# Patient Record
Sex: MALE | Race: WHITE
[De-identification: names, ages, dates, MRNs, and addresses within clinical notes are randomized per-mention and may not be internally consistent; named-entity substitution may affect disease eponyms.]

---
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
## 2020-10-29 ENCOUNTER — LAB REQUISITION (OUTPATIENT)
Dept: LAB | Facility: HOSPITAL | Age: 20
End: 2020-10-29

## 2020-10-29 DIAGNOSIS — Z00.00 ROUTINE GENERAL MEDICAL EXAMINATION AT A HEALTH CARE FACILITY: ICD-10-CM

## 2020-10-29 LAB — SARS-COV-2 RNA RESP QL NAA+PROBE: NOT DETECTED

## 2020-10-29 PROCEDURE — 87635 SARS-COV-2 COVID-19 AMP PRB: CPT

## 2022-09-10 PROCEDURE — 87147 CULTURE TYPE IMMUNOLOGIC: CPT | Performed by: NURSE PRACTITIONER

## 2022-09-10 PROCEDURE — 87070 CULTURE OTHR SPECIMN AEROBIC: CPT | Performed by: NURSE PRACTITIONER

## 2022-09-10 PROCEDURE — 87186 SC STD MICRODIL/AGAR DIL: CPT | Performed by: NURSE PRACTITIONER

## 2022-09-10 PROCEDURE — 87205 SMEAR GRAM STAIN: CPT | Performed by: NURSE PRACTITIONER

## 2022-09-14 ENCOUNTER — TELEPHONE (OUTPATIENT)
Dept: URGENT CARE | Facility: CLINIC | Age: 22
End: 2022-09-14

## 2022-09-14 DIAGNOSIS — A49.01 STAPH AUREUS INFECTION: Primary | ICD-10-CM

## 2022-09-14 DIAGNOSIS — B95.0 STREPTOCOCCAL INFECTION GROUP A: ICD-10-CM

## 2022-09-14 NOTE — TELEPHONE ENCOUNTER
Attempted to contact the patient via telephone with no answer.  Called patient's contact number with notification that number is not accepting colors at this time.  Attempted to contact secondary contact number with notification that their voicemail box is full.  Patient tested positive for Staph aureus and Streptococcus pyrogenes.  Staph shows sensitivity to Bactrim and strep shows sensitivity to penicillins.  Patient started on Keflex and Bactrim in office which are appropriate treatments for infections.  Patient should complete entire course of antibiotics as prescribed.  We will attempt to contact the patient again at a later time.

## 2022-09-15 ENCOUNTER — TELEPHONE (OUTPATIENT)
Dept: URGENT CARE | Facility: CLINIC | Age: 22
End: 2022-09-15

## 2022-09-15 DIAGNOSIS — A49.01 STAPH AUREUS INFECTION: Primary | ICD-10-CM

## 2022-09-15 DIAGNOSIS — B95.0 STREPTOCOCCAL INFECTION GROUP A: ICD-10-CM

## 2022-09-15 NOTE — TELEPHONE ENCOUNTER
Attempted to contact the patient via telephone with no answer.  Called patient's contact number with notification that number is not accepting colors at this time.  Attempted to contact secondary contact number with notification that their voicemail box is full.  Patient tested positive for Staph aureus and Streptococcus pyrogenes.  Staph shows sensitivity to Bactrim and strep shows sensitivity to penicillins.  Patient started on Keflex and Bactrim in office which are appropriate treatments for infections.  Patient should complete entire course of antibiotics as prescribed.

## 2023-10-29 ENCOUNTER — HOSPITAL ENCOUNTER (EMERGENCY)
Facility: HOSPITAL | Age: 23
Discharge: HOME | End: 2023-10-29
Attending: EMERGENCY MEDICINE | Admitting: EMERGENCY MEDICINE
Payer: COMMERCIAL

## 2023-10-29 VITALS
OXYGEN SATURATION: 98 % | HEART RATE: 93 BPM | DIASTOLIC BLOOD PRESSURE: 78 MMHG | RESPIRATION RATE: 14 BRPM | SYSTOLIC BLOOD PRESSURE: 136 MMHG | TEMPERATURE: 98.6 F

## 2023-10-29 DIAGNOSIS — F19.10 SUBSTANCE ABUSE: Primary | ICD-10-CM

## 2023-10-29 PROCEDURE — 63710000001 ONDANSETRON ODT 4 MG TABLET DISPERSIBLE: Performed by: EMERGENCY MEDICINE

## 2023-10-29 PROCEDURE — 99283 EMERGENCY DEPT VISIT LOW MDM: CPT

## 2023-10-29 RX ORDER — ONDANSETRON 4 MG/1
4 TABLET, ORALLY DISINTEGRATING ORAL ONCE
Status: COMPLETED | OUTPATIENT
Start: 2023-10-29 | End: 2023-10-29

## 2023-10-29 RX ADMIN — ONDANSETRON 4 MG: 4 TABLET, ORALLY DISINTEGRATING ORAL at 14:33

## 2023-10-29 NOTE — DISCHARGE INSTRUCTIONS
Seek help at any of the resources listed below for your substance use:  Alcoholics Anonymous [facility]Help Group [phone]  Narcotics Anonymous Self-Help Group [phone]  [facility] [phone]  [facility] [phone]  [facility] [phone]  [facility] [phone]  [facility] [phone]  [facility] [phone]  [facility] [phone]  [facility] [phone]    If at any point you have thoughts of harming yourself (thoughts of suicide), then you can call:    National Suicide Prevention Lifeline  The Lifeline provides 24/7, free and confidential support for people in distress as well as prevention and crisis resources for you or your loved ones.    [phone]

## 2023-10-29 NOTE — ED PROVIDER NOTES
" EMERGENCY DEPARTMENT ENCOUNTER    Room Number:  22/22  PCP: Provider, No Known  Patient Care Team:  Provider, No Known as PCP - General   Independent Historians: Patient and ems    HPI:  Chief Complaint: overdose     A complete HPI/ROS/PMH/PSH/SH/FH are unobtainable due to: Patient a poor historian and uncooperative with providing details    Chronic or social conditions impacting patient care (Social Determinants of Health): Uncertain housing situation  (Financial Resource Strain / Food Insecurity / Transportation Needs / Physical Activity / Stress / Social Connections / Intimate Partner Violence / Housing Stability)    Context: Navneet Gil is a 23 y.o. male who presents to the ED c/o acute drug overdose.  Patient found obtunded and given 6 mg of nasal Narcan by first responders.  Patient was then transported here for further evaluation as he had good response and woke up with Narcan but then started repeatedly vomiting.  Patient states he has overdosed before and required Narcan and is aware of the \"side effects\" of the medication.  Despite patient just recently vomiting he is already asking for peanut butter and something to drink and \"some pills\" to help him feel better.    Review of prior external notes (non-ED) -and- Review of prior external test results outside of this encounter: None available    Prescription drug monitoring program review:         PAST MEDICAL HISTORY  Active Ambulatory Problems     Diagnosis Date Noted    No Active Ambulatory Problems     Resolved Ambulatory Problems     Diagnosis Date Noted    No Resolved Ambulatory Problems     Past Medical History:   Diagnosis Date    Depression     Hypertension          PAST SURGICAL HISTORY  No past surgical history on file.      FAMILY HISTORY  No family history on file.      SOCIAL HISTORY  Social History     Socioeconomic History    Marital status: Single   Tobacco Use    Smoking status: Every Day     Packs/day: .5     Types: Cigarettes    " Smokeless tobacco: Never   Vaping Use    Vaping Use: Never used   Substance and Sexual Activity    Alcohol use: Yes     Comment: social    Drug use: Yes     Types: IV, Methamphetamines, Marijuana, Cocaine(coke)         ALLERGIES  Patient has no known allergies.        REVIEW OF SYSTEMS  Review of Systems  Included in HPI  All systems reviewed and negative except for those discussed in HPI.      PHYSICAL EXAM    I have reviewed the triage vital signs and nursing notes.    ED Triage Vitals [10/29/23 1408]   Temp Heart Rate Resp BP SpO2   98.6 °F (37 °C) 114 14 136/78 98 %      Temp src Heart Rate Source Patient Position BP Location FiO2 (%)   -- -- -- -- --       Physical Exam  GENERAL: Disheveled and not conversant  male, alert, repeatedly asking for something to eat  SKIN: Warm, dry  HENT: Normocephalic, atraumatic  EYES: no scleral icterus, no conjunctivitis  CV: regular rhythm, regular rate, no murmur  RESPIRATORY: normal effort, lungs clear, no rhonchi  ABDOMEN: soft, nontender, nondistended  MUSCULOSKELETAL: no deformity  NEURO: alert, moves all extremities, follows commands                                                                 MEDICATIONS GIVEN IN ER    Medications   ondansetron ODT (ZOFRAN-ODT) disintegrating tablet 4 mg (4 mg Oral Given 10/29/23 1433)         ORDERS PLACED DURING THIS VISIT:  Orders Placed This Encounter   Procedures    Pulse Oximetry, Continuous         PROGRESS, DATA ANALYSIS, CONSULTS, AND MEDICAL DECISION MAKING    All labs have been independently interpreted by me.  All radiology studies have been reviewed by me.   EKG's independently viewed and interpreted by me.  Discussion below represents my analysis of pertinent findings related to patient's condition, differential diagnosis, treatment plan and final disposition.    MDM patient here status post heroin overdose.  Patient responded briskly to Narcan and has typical post Narcan withdrawal symptoms including nausea,  vomiting, chills, and body aches.  Patient already asking for something to eat and drink.  Will observe patient for any respiratory depression with continuous pulse ox.  We will give Zofran ODT and p.o. challenge patient..    ED Course as of 10/29/23 1533   Sun Oct 29, 2023   1522 Patient tolerating p.o. and alert and oriented and ambulatory. [AR]      ED Course User Index  [AR] Any Holland MD           PPE: I wore and adhered to appropriate PPE per hospital protocols for specific patient presentation. (For respiratory patients with suspected Covid-19 or other infectious etiology suspected for patient's symptoms, the patient wore a mask and I wore an N95 mask throughout the entire patient encounter.) Proper hand hygiene both before and after patient encounter was performed as well.         AS OF 15:33 EDT VITALS:    BP - 136/78  HR - 93  TEMP - 98.6 °F (37 °C)  O2 SATS - 98%        DIAGNOSIS  Final diagnoses:   Substance abuse         DISPOSITION  ED Disposition       ED Disposition   Discharge    Condition   Stable    Comment   --                  Note Disclaimer: At [facility], we believe that sharing information builds trust and better relationships. You are receiving this note because you recently visited [facility]. It is possible you will see health information before a provider has talked with you about it. This kind of information can be easy to misunderstand. To help you fully understand what it means for your health, we urge you to discuss this note with your provider.         Any Holland MD  10/29/23 1534

## 2023-10-29 NOTE — ED NOTES
Pt to ER via EMS from the side of the road. Pt was found to be unresponsive, given 6mg of Narcan intranasally. Pt is now AxO x4 but vomiting profusely Pt shot up heroin

## 2024-03-10 ENCOUNTER — APPOINTMENT (OUTPATIENT)
Dept: GENERAL RADIOLOGY | Facility: HOSPITAL | Age: 24
End: 2024-03-10
Payer: COMMERCIAL

## 2024-03-10 ENCOUNTER — HOSPITAL ENCOUNTER (EMERGENCY)
Facility: HOSPITAL | Age: 24
Discharge: HOME | End: 2024-03-10
Attending: EMERGENCY MEDICINE | Admitting: EMERGENCY MEDICINE
Payer: COMMERCIAL

## 2024-03-10 ENCOUNTER — APPOINTMENT (OUTPATIENT)
Dept: CT IMAGING | Facility: HOSPITAL | Age: 24
End: 2024-03-10
Payer: COMMERCIAL

## 2024-03-10 VITALS
WEIGHT: 168 LBS | DIASTOLIC BLOOD PRESSURE: 86 MMHG | HEART RATE: 94 BPM | TEMPERATURE: 96.3 F | RESPIRATION RATE: 18 BRPM | SYSTOLIC BLOOD PRESSURE: 131 MMHG | BODY MASS INDEX: 32.98 KG/M2 | HEIGHT: 60 IN | OXYGEN SATURATION: 93 %

## 2024-03-10 VITALS
HEART RATE: 75 BPM | HEIGHT: 71 IN | RESPIRATION RATE: 16 BRPM | TEMPERATURE: 97.7 F | DIASTOLIC BLOOD PRESSURE: 71 MMHG | OXYGEN SATURATION: 100 % | WEIGHT: 168 LBS | SYSTOLIC BLOOD PRESSURE: 128 MMHG | BODY MASS INDEX: 23.52 KG/M2

## 2024-03-10 DIAGNOSIS — F19.10 POLYSUBSTANCE ABUSE: Primary | ICD-10-CM

## 2024-03-10 DIAGNOSIS — Z59.00 HOMELESS: ICD-10-CM

## 2024-03-10 DIAGNOSIS — S50.11XA CONTUSION OF RIGHT FOREARM, INITIAL ENCOUNTER: ICD-10-CM

## 2024-03-10 DIAGNOSIS — S50.01XA CONTUSION OF RIGHT ELBOW, INITIAL ENCOUNTER: Primary | ICD-10-CM

## 2024-03-10 DIAGNOSIS — S09.90XA MINOR CLOSED HEAD INJURY: ICD-10-CM

## 2024-03-10 DIAGNOSIS — R40.0 SOMNOLENCE: ICD-10-CM

## 2024-03-10 LAB
ALBUMIN SERPL-MCNC: 3.8 G/DL (ref 3.5–5.2)
ALBUMIN/GLOB SERPL: 1.1 G/DL
ALP SERPL-CCNC: 77 U/L (ref 39–117)
ALT SERPL W P-5'-P-CCNC: 16 U/L (ref 1–41)
AMPHET+METHAMPHET UR QL: NEGATIVE
ANION GAP SERPL CALCULATED.3IONS-SCNC: 10 MMOL/L (ref 5–15)
AST SERPL-CCNC: 14 U/L (ref 1–40)
BARBITURATES UR QL SCN: NEGATIVE
BASOPHILS # BLD AUTO: 0.03 10*3/MM3 (ref 0–0.2)
BASOPHILS NFR BLD AUTO: 0.4 % (ref 0–1.5)
BENZODIAZ UR QL SCN: NEGATIVE
BILIRUB SERPL-MCNC: <0.2 MG/DL (ref 0–1.2)
BUN SERPL-MCNC: 12 MG/DL (ref 6–20)
BUN/CREAT SERPL: 19.7 (ref 7–25)
CALCIUM SPEC-SCNC: 9.2 MG/DL (ref 8.6–10.5)
CANNABINOIDS SERPL QL: POSITIVE
CHLORIDE SERPL-SCNC: 104 MMOL/L (ref 98–107)
CO2 SERPL-SCNC: 27 MMOL/L (ref 22–29)
COCAINE UR QL: POSITIVE
CREAT SERPL-MCNC: 0.61 MG/DL (ref 0.76–1.27)
DEPRECATED RDW RBC AUTO: 42.9 FL (ref 37–54)
EGFRCR SERPLBLD CKD-EPI 2021: 136.7 ML/MIN/1.73
EOSINOPHIL # BLD AUTO: 0.35 10*3/MM3 (ref 0–0.4)
EOSINOPHIL NFR BLD AUTO: 4.2 % (ref 0.3–6.2)
ERYTHROCYTE [DISTWIDTH] IN BLOOD BY AUTOMATED COUNT: 13.7 % (ref 12.3–15.4)
FENTANYL UR-MCNC: NEGATIVE NG/ML
GLOBULIN UR ELPH-MCNC: 3.4 GM/DL
GLUCOSE SERPL-MCNC: 122 MG/DL (ref 65–99)
HCT VFR BLD AUTO: 34.8 % (ref 37.5–51)
HGB BLD-MCNC: 11.2 G/DL (ref 13–17.7)
IMM GRANULOCYTES # BLD AUTO: 0.02 10*3/MM3 (ref 0–0.05)
IMM GRANULOCYTES NFR BLD AUTO: 0.2 % (ref 0–0.5)
LYMPHOCYTES # BLD AUTO: 3.28 10*3/MM3 (ref 0.7–3.1)
LYMPHOCYTES NFR BLD AUTO: 39.3 % (ref 19.6–45.3)
MCH RBC QN AUTO: 27.8 PG (ref 26.6–33)
MCHC RBC AUTO-ENTMCNC: 32.2 G/DL (ref 31.5–35.7)
MCV RBC AUTO: 86.4 FL (ref 79–97)
METHADONE UR QL SCN: NEGATIVE
MONOCYTES # BLD AUTO: 0.51 10*3/MM3 (ref 0.1–0.9)
MONOCYTES NFR BLD AUTO: 6.1 % (ref 5–12)
NEUTROPHILS NFR BLD AUTO: 4.16 10*3/MM3 (ref 1.7–7)
NEUTROPHILS NFR BLD AUTO: 49.8 % (ref 42.7–76)
NRBC BLD AUTO-RTO: 0 /100 WBC (ref 0–0.2)
OPIATES UR QL: NEGATIVE
OXYCODONE UR QL SCN: NEGATIVE
PLATELET # BLD AUTO: 457 10*3/MM3 (ref 140–450)
PMV BLD AUTO: 8.7 FL (ref 6–12)
POTASSIUM SERPL-SCNC: 3.9 MMOL/L (ref 3.5–5.2)
PROT SERPL-MCNC: 7.2 G/DL (ref 6–8.5)
RBC # BLD AUTO: 4.03 10*6/MM3 (ref 4.14–5.8)
SODIUM SERPL-SCNC: 141 MMOL/L (ref 136–145)
WBC NRBC COR # BLD AUTO: 8.35 10*3/MM3 (ref 3.4–10.8)

## 2024-03-10 PROCEDURE — 85025 COMPLETE CBC W/AUTO DIFF WBC: CPT | Performed by: NURSE PRACTITIONER

## 2024-03-10 PROCEDURE — 80307 DRUG TEST PRSMV CHEM ANLYZR: CPT | Performed by: NURSE PRACTITIONER

## 2024-03-10 PROCEDURE — 73080 X-RAY EXAM OF ELBOW: CPT

## 2024-03-10 PROCEDURE — 73090 X-RAY EXAM OF FOREARM: CPT

## 2024-03-10 PROCEDURE — 25810000003 SODIUM CHLORIDE 0.9 % SOLUTION: Performed by: NURSE PRACTITIONER

## 2024-03-10 PROCEDURE — 96360 HYDRATION IV INFUSION INIT: CPT

## 2024-03-10 PROCEDURE — 99284 EMERGENCY DEPT VISIT MOD MDM: CPT

## 2024-03-10 PROCEDURE — 73110 X-RAY EXAM OF WRIST: CPT

## 2024-03-10 PROCEDURE — 80053 COMPREHEN METABOLIC PANEL: CPT | Performed by: NURSE PRACTITIONER

## 2024-03-10 PROCEDURE — 99283 EMERGENCY DEPT VISIT LOW MDM: CPT

## 2024-03-10 PROCEDURE — 73070 X-RAY EXAM OF ELBOW: CPT

## 2024-03-10 PROCEDURE — 70450 CT HEAD/BRAIN W/O DYE: CPT

## 2024-03-10 RX ORDER — SODIUM CHLORIDE 0.9 % (FLUSH) 0.9 %
10 SYRINGE (ML) INJECTION AS NEEDED
Status: DISCONTINUED | OUTPATIENT
Start: 2024-03-10 | End: 2024-03-10 | Stop reason: HOSPADM

## 2024-03-10 RX ADMIN — SODIUM CHLORIDE 1000 ML: 9 INJECTION, SOLUTION INTRAVENOUS at 06:45

## 2024-03-10 SDOH — ECONOMIC STABILITY - HOUSING INSECURITY: HOMELESSNESS UNSPECIFIED: Z59.00

## 2024-03-10 NOTE — ED PROVIDER NOTES
MD ATTESTATION NOTE    SHARED VISIT: This visit was performed by BOTH a physician and an APC. The substantive portion of the medical decision making was performed by this attesting physician who made or approved the management plan and takes responsibility for patient management. All studies in the APC note (if performed) were independently interpreted by me.     The AFTAB and I have discussed this patient's history, physical exam, and treatment plan.  I have reviewed the documentation and affirm the documentation and agree with the treatment and plan.  The attached note describes my personal findings.      Independent Historians: EMS    A complete HPI/ROS/PMH/PSH/SH/FH are unobtainable due to: Uncooperative    Chronic or social conditions impacting patient care (social determinants of health): Gareth Gil is a 23 y.o. male who presents to the ED c/o acute substance abuse.          On exam:  GENERAL: Drowsy but arousable, no acute distress, admits heroin and cocaine use  SKIN: Warm, dry  HENT: Normocephalic, atraumatic, dry mucous membranes EYES: no scleral icterus  CV: regular rhythm, regular rate  RESPIRATORY: normal effort, lungs clear  ABDOMEN: soft, nontender, nondistended  MUSCULOSKELETAL: no deformity, no involuntary movements  NEURO: alert, moves all extremities, follows commands                                                             Labs  Recent Results (from the past 24 hour(s))   Comprehensive Metabolic Panel    Collection Time: 03/10/24  6:44 AM    Specimen: Blood   Result Value Ref Range    Glucose 122 (H) 65 - 99 mg/dL    BUN 12 6 - 20 mg/dL    Creatinine 0.61 (L) 0.76 - 1.27 mg/dL    Sodium 141 136 - 145 mmol/L    Potassium 3.9 3.5 - 5.2 mmol/L    Chloride 104 98 - 107 mmol/L    CO2 27.0 22.0 - 29.0 mmol/L    Calcium 9.2 8.6 - 10.5 mg/dL    Total Protein 7.2 6.0 - 8.5 g/dL    Albumin 3.8 3.5 - 5.2 g/dL    ALT (SGPT) 16 1 - 41 U/L    AST (SGOT) 14 1 - 40 U/L    Alkaline Phosphatase  77 39 - 117 U/L    Total Bilirubin <0.2 0.0 - 1.2 mg/dL    Globulin 3.4 gm/dL    A/G Ratio 1.1 g/dL    BUN/Creatinine Ratio 19.7 7.0 - 25.0    Anion Gap 10.0 5.0 - 15.0 mmol/L    eGFR 136.7 >60.0 mL/min/1.73   CBC Auto Differential    Collection Time: 03/10/24  6:44 AM    Specimen: Blood   Result Value Ref Range    WBC 8.35 3.40 - 10.80 10*3/mm3    RBC 4.03 (L) 4.14 - 5.80 10*6/mm3    Hemoglobin 11.2 (L) 13.0 - 17.7 g/dL    Hematocrit 34.8 (L) 37.5 - 51.0 %    MCV 86.4 79.0 - 97.0 fL    MCH 27.8 26.6 - 33.0 pg    MCHC 32.2 31.5 - 35.7 g/dL    RDW 13.7 12.3 - 15.4 %    RDW-SD 42.9 37.0 - 54.0 fl    MPV 8.7 6.0 - 12.0 fL    Platelets 457 (H) 140 - 450 10*3/mm3    Neutrophil % 49.8 42.7 - 76.0 %    Lymphocyte % 39.3 19.6 - 45.3 %    Monocyte % 6.1 5.0 - 12.0 %    Eosinophil % 4.2 0.3 - 6.2 %    Basophil % 0.4 0.0 - 1.5 %    Immature Grans % 0.2 0.0 - 0.5 %    Neutrophils, Absolute 4.16 1.70 - 7.00 10*3/mm3    Lymphocytes, Absolute 3.28 (H) 0.70 - 3.10 10*3/mm3    Monocytes, Absolute 0.51 0.10 - 0.90 10*3/mm3    Eosinophils, Absolute 0.35 0.00 - 0.40 10*3/mm3    Basophils, Absolute 0.03 0.00 - 0.20 10*3/mm3    Immature Grans, Absolute 0.02 0.00 - 0.05 10*3/mm3    nRBC 0.0 0.0 - 0.2 /100 WBC       Radiology  No Radiology Exams Resulted Within Past 24 Hours    Medical Decision Making:  ED Course as of 03/12/24 0913   Sun Mar 10, 2024   0655 Patient care transferred to Nelson Rivera PA-C. Patient is pending labs, MD evaluation, and disposition. [AR]   0727 Patient resting comfortably in bed at this time.  No acute distress. [DC]   1220 Patient continues to rest comfortably.  Vital signs stable.  No acute distress. [DC]   1308 Patient now eating, tolerates p.o. intake without difficulty. [DC]   1427 Cocaine Screen, Urine(!): Positive [DC]   1427 THC Screen, Urine(!): Positive [DC]   1458 Patient presentation and evaluation consistent with cocaine abuse and homelessness.  As he has been observed over the past 9 hours he  "continues with excessive somnolence and is not appropriate for discharge yet.  No indication for further workup but he will continue to require observation.  With clinical sobriety he will be discharged.  No indication for [DC]      ED Course User Index  [AR] Colleen Keith APRN  [DC] Olman Rivera PA       MDM: Patient presents from hotel after being \"found down\".  Patient however was awake and talking with EMS and never required any Narcan.  He does admit heroin and cocaine however.  Patient able to give some answers but still quite drowsy.  Patient will require basic labs including a tox screen and sober reevaluation when more awake and able to ambulate    Procedures:  Procedures        PPE: I followed hospital protocols for proper PPE based on patient presentation including use of N95 mask for suspected infectious respiratory conditions.  Proper hand hygiene was performed both before and after the patient encounter.          Diagnosis  Final diagnoses:   None   Polysubstance abuse    Dispo: Discharge with p treatment program information    Note Disclaimer: At [facility], we believe that sharing information builds trust and better relationships. You are receiving this note because you recently visited [facility]. It is possible you will see health information before a provider has talked with you about it. This kind of information can be easy to misunderstand. To help you fully understand what it means for your health, we urge you to discuss this note with your provider.       Any Holland MD  03/12/24 0916    "

## 2024-03-10 NOTE — ED PROVIDER NOTES
EMERGENCY DEPARTMENT ENCOUNTER  Room Number:  03/03  PCP: Provider, No Known  Independent Historians: Patient      HPI:  Chief Complaint: had concerns including Addiction Problem.     A complete HPI/ROS/PMH/PSH/SH/FH are unobtainable due to: None    Chronic or social conditions impacting patient care (Social Determinants of Health): Homeless      Context: Navneet Gil is a 23 y.o. male who presents to the emergency department via EMS after he was found in the hotel bathroom asleep.  Patient admits to using IV heroin approximately 5 to 6 hours ago.  He reports he uses heroin to help with his sleep because his whole body hurts.  Patient states he still is unable to sleep.  Patient was not found unconscious or unresponsive.  Patient denies fever, chills, headache, lightheadedness, dizziness, numbness, tingling, unilateral extremity weakness, syncope, shortness of breath, chest pain, abdominal pain, nausea, vomiting, diarrhea, dysuria, hematuria, or other complaints.      Review of prior external notes (non-ED) -and- Review of prior external test results outside of this encounter:   March 30, 2024: Emergency department visit at [facility].  Patient verbalized complaints of being hungry.      PAST MEDICAL HISTORY  Active Ambulatory Problems     Diagnosis Date Noted    No Active Ambulatory Problems     Resolved Ambulatory Problems     Diagnosis Date Noted    No Resolved Ambulatory Problems     Past Medical History:   Diagnosis Date    Depression     Hypertension          PAST SURGICAL HISTORY  No past surgical history on file.      FAMILY HISTORY  No family history on file.      SOCIAL HISTORY  Social History     Socioeconomic History    Marital status: Single   Tobacco Use    Smoking status: Every Day     Current packs/day: 0.50     Types: Cigarettes    Smokeless tobacco: Never   Vaping Use    Vaping status: Never Used   Substance and Sexual Activity    Alcohol use: Yes     Comment: social    Drug use:  Yes     Types: IV, Methamphetamines, Marijuana, Cocaine(coke)         ALLERGIES  Patient has no known allergies.      REVIEW OF SYSTEMS  Included in HPI  All systems reviewed and negative except for those discussed in HPI.      PHYSICAL EXAM    I have reviewed the triage vital signs and nursing notes.    ED Triage Vitals [03/10/24 0543]   Temp Heart Rate Resp BP SpO2   97.7 °F (36.5 °C) 93 16 143/86 100 %      Temp src Heart Rate Source Patient Position BP Location FiO2 (%)   -- -- -- -- --       Physical Exam    GENERAL: not distressed  HENT: nares patent  Head/neck/ face are symmetric without gross deformity or swelling  EYES: no scleral icterus  CV: regular rhythm, regular rate with intact distal pulses  RESPIRATORY: normal effort and no respiratory distress  ABDOMEN: soft and non-tender  MUSCULOSKELETAL: no deformity  NEURO: alert and appropriate, moves all extremities, follows commands  SKIN: warm, dry    Vital signs and nursing notes reviewed.        LAB RESULTS  Labs Reviewed   COMPREHENSIVE METABOLIC PANEL - Abnormal; Notable for the following components:       Result Value    Glucose 122 (*)     Creatinine 0.61 (*)     All other components within normal limits    Narrative:     GFR Normal >60  Chronic Kidney Disease <60  Kidney Failure <15     URINE DRUG SCREEN - Abnormal; Notable for the following components:    Cocaine Screen, Urine Positive (*)     THC, Screen, Urine Positive (*)     All other components within normal limits    Narrative:     Negative Thresholds Per Drugs Screened:    Amphetamines                 500 ng/ml  Barbiturates                 200 ng/ml  Benzodiazepines              100 ng/ml  Cocaine                      300 ng/ml  Methadone                    300 ng/ml  Opiates                      300 ng/ml  Oxycodone                    100 ng/ml  THC                           50 ng/ml  Fentanyl                       5 ng/ml      The Normal Value for all drugs tested is negative. This report  includes final unconfirmed screening results to be used for medical treatment purposes only. Unconfirmed results must not be used for non-medical purposes such as employment or legal testing. Clinical consideration should be applied to any drug of abuse test, particularly when unconfirmed results are used.           CBC WITH AUTO DIFFERENTIAL - Abnormal; Notable for the following components:    RBC 4.03 (*)     Hemoglobin 11.2 (*)     Hematocrit 34.8 (*)     Platelets 457 (*)     Lymphocytes, Absolute 3.28 (*)     All other components within normal limits   CBC AND DIFFERENTIAL    Narrative:     The following orders were created for panel order CBC & Differential.  Procedure                               Abnormality         Status                     ---------                               -----------         ------                     CBC Auto Differential[[id number]]        Abnormal            Final result                 Please view results for these tests on the individual orders.             RADIOLOGY  No Radiology Exams Resulted Within Past 24 Hours      MEDICATIONS GIVEN IN ER  Medications   sodium chloride 0.9 % flush 10 mL (has no administration in time range)   sodium chloride 0.9 % bolus 1,000 mL (1,000 mL Intravenous New Bag 3/10/24 0645)           OUTPATIENT MEDICATION MANAGEMENT:  Current Facility-Administered Medications Ordered in Epic   Medication Dose Route Frequency Provider Last Rate Last Admin    sodium chloride 0.9 % bolus 1,000 mL  1,000 mL Intravenous Once Colleen Keith APRN 1,000 mL/hr at 03/10/24 0645 1,000 mL at 03/10/24 0645    sodium chloride 0.9 % flush 10 mL  10 mL Intravenous PRN Colleen Keith APRN         Current Outpatient Medications Ordered in Epic   Medication Sig Dispense Refill    Invega Sustenna 234 MG/1.5ML suspension prefilled syringe IM injection ADMINISTER 1.5 ML IN THE MUSCLE EVERY 28 DAYS FOR PSYCHOSIS               PROGRESS, DATA ANALYSIS, CONSULTS, AND MEDICAL  DECISION MAKING  ORDERS PLACED DURING THIS VISIT:  Orders Placed This Encounter   Procedures    Comprehensive Metabolic Panel    Urine Drug Screen - Urine, Clean Catch    CBC Auto Differential    Monitor Blood Pressure    Pulse Oximetry, Continuous    Insert Peripheral IV    CBC & Differential       All labs have been independently interpreted by me.  All radiology studies have been reviewed by me. All EKG's have been independently viewed and interpreted by me.  Discussion below represents my analysis of pertinent findings related to patient's condition, differential diagnosis, treatment plan and final disposition.    Differential diagnosis includes but is not limited to:   Opioid abuse, cannabis abuse, polysubstance abuse, etc.    ED Course:  ED Course as of 03/11/24 1425   Sun Mar 10, 2024   0655 Patient care transferred to Nelson Rivera PA-C. Patient is pending labs, MD evaluation, and disposition. [AR]   0727 Patient resting comfortably in bed at this time.  No acute distress. [DC]   1220 Patient continues to rest comfortably.  Vital signs stable.  No acute distress. [DC]   1308 Patient now eating, tolerates p.o. intake without difficulty. [DC]   1427 Cocaine Screen, Urine(!): Positive [DC]   1427 THC Screen, Urine(!): Positive [DC]   1458 Patient presentation and evaluation consistent with cocaine abuse and homelessness.  As he has been observed over the past 9 hours he continues with excessive somnolence and is not appropriate for discharge yet.  No indication for further workup but he will continue to require observation.  With clinical sobriety he will be discharged.  No indication for [DC]      ED Course User Index  [AR] Colleen Keith APRN  [DC] Olman Rivera PA           AS OF 07:07 EDT VITALS:    BP - 143/86  HR - 93  TEMP - 97.7 °F (36.5 °C)  O2 SATS - 100%      MDM:  Patient is a 23-year-old  male who presents to the emergency department after he was found asleep in the hotel bathroom.  Patient  admitted to using heroin earlier in the day.  Patient verbalized difficulty sleeping.  Patient will be observed in the ED to ensure safety.  Ultimately patient can be discharged after a time.  No suicidal or homicidal ideations.      COMPLEXITY OF CARE  Admission was considered but after careful review of the patient's presentation, physical examination, diagnostic results, and response to treatment the patient may be safely discharged with outpatient follow-up.        DIAGNOSIS  Final diagnoses:   Polysubstance abuse   Somnolence         DISPOSITION  ED Disposition       ED Disposition   Discharge    Condition   Stable    Comment   --                      Please note that portions of this document were completed with a voice recognition program.    Note Disclaimer: At [facility], we believe that sharing information builds trust and better relationships. You are receiving this note because you recently visited [facility]. It is possible you will see health information before a provider has talked with you about it. This kind of information can be easy to misunderstand. To help you fully understand what it means for your health, we urge you to discuss this note with your provider.       Colleen Keith, VINH  03/11/24 1420

## 2024-03-11 NOTE — DISCHARGE INSTRUCTIONS
Return to the emergency department for worsening pain, dizziness, confusion, vomiting, or other concern.

## 2024-03-11 NOTE — ED PROVIDER NOTES
EMERGENCY DEPARTMENT ENCOUNTER    Room Number:  33/33  PCP: Provider, No Known  Historian: Patient      HPI:  Chief Complaint: Fall, right arm injury  A complete HPI/ROS/PMH/PSH/SH/FH are unobtainable due to: Patient is a poor historian  Context: Navneet Gil is a 23 y.o. male with a medical history of substance abuse who presents to the ED c/o acute right arm injury.  Patient says he fell several hours ago.  He landed on his right arm and also hit his head.  He is unsure if he lost consciousness.  He complains of a headache and pain in his right elbow and right forearm.  Denies neck pain, chest pain, abdominal pain, nausea, vomiting, dizziness, vision changes, or numbness/tingling/weakness in his extremities.  Patient was seen here in the ED early this morning for substance abuse.  Patient is currently homeless            PAST MEDICAL HISTORY  Active Ambulatory Problems     Diagnosis Date Noted    No Active Ambulatory Problems     Resolved Ambulatory Problems     Diagnosis Date Noted    No Resolved Ambulatory Problems     Past Medical History:   Diagnosis Date    Depression     Hypertension          PAST SURGICAL HISTORY  History reviewed. No pertinent surgical history.      FAMILY HISTORY  History reviewed. No pertinent family history.      SOCIAL HISTORY  Social History     Socioeconomic History    Marital status: Single   Tobacco Use    Smoking status: Every Day     Current packs/day: 0.50     Types: Cigarettes    Smokeless tobacco: Never   Vaping Use    Vaping status: Never Used   Substance and Sexual Activity    Alcohol use: Yes     Comment: social    Drug use: Yes     Types: IV, Methamphetamines, Marijuana, Cocaine(coke)         ALLERGIES  Patient has no known allergies.    REVIEW OF SYSTEMS  Review of Systems  Included in HPI  All systems reviewed and negative except for those discussed in HPI.      PHYSICAL EXAM  ED Triage Vitals   Temp Heart Rate Resp BP SpO2   03/10/24 2110 03/10/24 2110 03/10/24 2110  03/10/24 2113 03/10/24 2110   96.3 °F (35.7 °C) 94 18 131/86 93 %      Temp src Heart Rate Source Patient Position BP Location FiO2 (%)   03/10/24 2110 03/10/24 2110 03/10/24 2113 03/10/24 2113 --   Tympanic Monitor Lying Left arm        Physical Exam      GENERAL: Awake, alert, and oriented x 3.  Disheveled but nontoxic-appearing male.  Resting comfortably in no acute distress  HENT: NCAT, nares patent  EYES: PERRL, EOMI  CV: regular rhythm, normal rate, normal right radial pulse  RESPIRATORY: normal effort, clear to auscultation bilaterally  ABDOMEN: soft  MUSCULOSKELETAL: There is tenderness of the right posterior elbow and right mid forearm.  There were several abrasions and scabs on the right forearm.  Right shoulder, upper arm, wrist, and hand are nontender.  C/T/L spine and chest are nontender.  Left arm and both lower extremities are nontender  NEURO: Speech is normal.  No facial droop.  Normal strength and light touch sensation in all extremities  PSYCH:  calm, cooperative  SKIN: warm, dry    Vital signs and nursing notes reviewed.          LAB RESULTS  Recent Results (from the past 24 hour(s))   Comprehensive Metabolic Panel    Collection Time: 03/10/24  6:44 AM    Specimen: Blood   Result Value Ref Range    Glucose 122 (H) 65 - 99 mg/dL    BUN 12 6 - 20 mg/dL    Creatinine 0.61 (L) 0.76 - 1.27 mg/dL    Sodium 141 136 - 145 mmol/L    Potassium 3.9 3.5 - 5.2 mmol/L    Chloride 104 98 - 107 mmol/L    CO2 27.0 22.0 - 29.0 mmol/L    Calcium 9.2 8.6 - 10.5 mg/dL    Total Protein 7.2 6.0 - 8.5 g/dL    Albumin 3.8 3.5 - 5.2 g/dL    ALT (SGPT) 16 1 - 41 U/L    AST (SGOT) 14 1 - 40 U/L    Alkaline Phosphatase 77 39 - 117 U/L    Total Bilirubin <0.2 0.0 - 1.2 mg/dL    Globulin 3.4 gm/dL    A/G Ratio 1.1 g/dL    BUN/Creatinine Ratio 19.7 7.0 - 25.0    Anion Gap 10.0 5.0 - 15.0 mmol/L    eGFR 136.7 >60.0 mL/min/1.73   CBC Auto Differential    Collection Time: 03/10/24  6:44 AM    Specimen: Blood   Result Value Ref  Range    WBC 8.35 3.40 - 10.80 10*3/mm3    RBC 4.03 (L) 4.14 - 5.80 10*6/mm3    Hemoglobin 11.2 (L) 13.0 - 17.7 g/dL    Hematocrit 34.8 (L) 37.5 - 51.0 %    MCV 86.4 79.0 - 97.0 fL    MCH 27.8 26.6 - 33.0 pg    MCHC 32.2 31.5 - 35.7 g/dL    RDW 13.7 12.3 - 15.4 %    RDW-SD 42.9 37.0 - 54.0 fl    MPV 8.7 6.0 - 12.0 fL    Platelets 457 (H) 140 - 450 10*3/mm3    Neutrophil % 49.8 42.7 - 76.0 %    Lymphocyte % 39.3 19.6 - 45.3 %    Monocyte % 6.1 5.0 - 12.0 %    Eosinophil % 4.2 0.3 - 6.2 %    Basophil % 0.4 0.0 - 1.5 %    Immature Grans % 0.2 0.0 - 0.5 %    Neutrophils, Absolute 4.16 1.70 - 7.00 10*3/mm3    Lymphocytes, Absolute 3.28 (H) 0.70 - 3.10 10*3/mm3    Monocytes, Absolute 0.51 0.10 - 0.90 10*3/mm3    Eosinophils, Absolute 0.35 0.00 - 0.40 10*3/mm3    Basophils, Absolute 0.03 0.00 - 0.20 10*3/mm3    Immature Grans, Absolute 0.02 0.00 - 0.05 10*3/mm3    nRBC 0.0 0.0 - 0.2 /100 WBC   Urine Drug Screen - Urine, Clean Catch    Collection Time: 03/10/24  1:54 PM    Specimen: Urine, Clean Catch   Result Value Ref Range    Amphet/Methamphet, Screen Negative Negative    Barbiturates Screen, Urine Negative Negative    Benzodiazepine Screen, Urine Negative Negative    Cocaine Screen, Urine Positive (A) Negative    Opiate Screen Negative Negative    THC, Screen, Urine Positive (A) Negative    Methadone Screen, Urine Negative Negative    Oxycodone Screen, Urine Negative Negative    Fentanyl, Urine Negative Negative       Ordered the above labs and reviewed the results.        RADIOLOGY  XR Forearm 2 View Right, XR Wrist 3+ View Right, XR Elbow 2 View Right    Result Date: 3/10/2024  RIGHT ELBOW, RIGHT FOREARM: AP, LATERAL RIGHT WRIST: 4 VIEWS  HISTORY: Fall with right elbow and arm injury.  COMPARISON: None  FINDINGS: RIGHT ELBOW: There is no evidence for fracture or osseous abnormality. Soft tissues appear normal.  RIGHT FOREARM: There is no evidence for fracture or osseous abnormality. The soft tissues appear within  normal limits.  RIGHT WRIST: There is mild ulna minus variant. There is no evidence for fracture or acute osseous abnormality. Soft tissues appear within normal limits. There is no direct lateral view.        CT Head Without Contrast    Result Date: 3/10/2024  Patient: TATIANA RODARTE  Time Out: 22:05 Exam(s): CT HEAD Without Contrast EXAM:   CT Head Without Intravenous Contrast CLINICAL HISTORY:    Reason for exam: Fell, hit head. TECHNIQUE:   Axial computed tomography images of the head brain without intravenous contrast.  CTDI is 55.18 mGy and DLP is 960.4 mGy-cm.  This CT exam was performed according to the principle of ALARA (As Low As Reasonably Achievable) by using one or more of the following dose reduction techniques: automated exposure control, adjustment of the mA and or kV according to patient size, and or use of iterative reconstruction technique. COMPARISON:   No relevant prior studies available. FINDINGS: No acute intracranial hemorrhage.  No midline shift or mass effect. The territorial gray-white matter differentiation is maintained throughout. The ventricles and sulci are commensurate with age. The visualized orbits appear grossly unremarkable. The calvarium is intact. The visualized paranasal sinuses and mastoid air cells are grossly clear. IMPRESSION: No acute intracranial hemorrhage, midline shift, or mass effect.     Electronically signed by Greg Bermudez MD on 03-10-24 at 2205     Ordered the above noted radiological studies. Reviewed by me in PACS.            PROCEDURES  Procedures        OUTPATIENT MEDICATION MANAGEMENT:  No current Epic-ordered facility-administered medications on file.     Current Outpatient Medications Ordered in Epic   Medication Sig Dispense Refill    Invega Sustenna 234 MG/1.5ML suspension prefilled syringe IM injection ADMINISTER 1.5 ML IN THE MUSCLE EVERY 28 DAYS FOR PSYCHOSIS             MEDICATIONS GIVEN IN ER  Medications - No data to display                MEDICAL  DECISION MAKING, PROGRESS, and CONSULTS    All labs have been independently reviewed by me.  All radiology studies have been reviewed by me and I have also reviewed the radiology report.   EKG's independently viewed and interpreted by me.  Discussion below represents my analysis of pertinent findings related to patient's condition, differential diagnosis, treatment plan and final disposition.      Additional sources:    - Discussed/ obtained information from independent historians: None    - External (non-ED) record review: Patient has multiple ED visits here and at other facilities for polysubstance abuse.Head CT done on 2/3/2024 was negative acute. Urine drug screen done on 3/5/2024 was positive for benzos and THC.    -Prescription drug monitoring program review:     N/A    - Chronic or social conditions impacting patient care (Social Determinants of Health): Homeless          Orders placed during this visit:  Orders Placed This Encounter   Procedures    CT Head Without Contrast    XR Forearm 2 View Right    XR Wrist 3+ View Right    XR Elbow 2 View Right         Additional orders considered but not ordered:          Differential diagnosis includes, but is not limited to:    Elbow/forearm fracture/contusion/sprain/dislocation; intracranial hemorrhage, skull fracture, closed head injury      Independent interpretation of labs, radiology studies, and discussions with consultants:  ED Course as of 03/10/24 2212   Sun Mar 10, 2024   2132 Head CT personally interpreted by me.  My personal interpretation is: No intracranial hemorrhage.  No skull fracture. [WH]   2151 Right elbow, forearm, and wrist x-rays personally interpreted by me.  My personal interpretation is no fracture.  No dislocation.   [WH]   2201 Imaging results discussed with the patient.  He is resting comfortably.  He remains awake and alert.  Denies nausea, dizziness, or vomiting.  He will be discharged.  Return precautions were discussed [WH]      ED  Course User Index  [WH] Christopher Lorenzo MD         COMPLEXITY OF CARE        DIAGNOSIS  Final diagnoses:   Contusion of right elbow, initial encounter   Contusion of right forearm, initial encounter   Minor closed head injury   Homeless         DISPOSITION  DISCHARGE    Patient discharged in stable condition.    Reviewed implications of results, diagnosis, meds, responsibility to follow up, warning signs and symptoms of possible worsening, potential complications and reasons to return to ER, including worsening/persistent symptoms, nausea, vomiting, dizziness, or other concern    Patient/Family voiced understanding of above instructions.    Discussed plan for discharge, as there is no emergent indication for admission. Patient referred to primary care provider for BP management due to today's BP. Pt/family is agreeable and understands need for follow up and repeat testing.  Pt is aware that discharge does not mean that nothing is wrong but it indicates no emergency is present that requires admission and they must continue care with follow-up as given below or physician of their choice.     FOLLOW-UP  PATIENT CONNECTION - [facility] [id number]  [phone]             Medication List      No changes were made to your prescriptions during this visit.                   Latest Documented Vital Signs:  AS OF 22:12 EDT VITALS:    BP - 131/86  HR - 94  TEMP - 96.3 °F (35.7 °C) (Tympanic)  O2 SATS - 93%            --    Please note that portions of this were completed with a voice recognition program.       Note Disclaimer: At [facility], we believe that sharing information builds trust and better relationships. You are receiving this note because you are receiving care at [facility] or recently visited. It is possible you will see health information before a provider has talked with you about it. This kind of information can be easy to misunderstand. To help you fully understand what it means  for your health, we urge you to discuss this note with your provider.             Christopher Lorenzo MD  03/10/24 4524

## 2024-03-11 NOTE — ED PROVIDER NOTES
ED Course as of 03/11/24 0954   Sun Mar 10, 2024   0727 Patient resting comfortably in bed at this time.  No acute distress. [DC]   1220 Patient continues to rest comfortably.  Vital signs stable.  No acute distress. [DC]   1308 Patient now eating, tolerates p.o. intake without difficulty. [DC]   1427 Cocaine Screen, Urine(!): Positive [DC]   1427 THC Screen, Urine(!): Positive [DC]   1458 Patient presentation and evaluation consistent with cocaine abuse and homelessness.  As he has been observed over the past 9 hours he continues with excessive somnolence and is not appropriate for discharge yet.  No indication for further workup but he will continue to require observation.  With clinical sobriety he will be discharged.  No indication for further workup at this time. [DC]      ED Course User Index  [DC] Olman Rivera PA Creason, David J, PA  03/11/24 0954

## 2024-10-01 ENCOUNTER — HOSPITAL ENCOUNTER (EMERGENCY)
Facility: HOSPITAL | Age: 24
Discharge: HOME | End: 2024-10-01
Attending: EMERGENCY MEDICINE
Payer: MEDICAID

## 2024-10-01 VITALS
HEART RATE: 85 BPM | DIASTOLIC BLOOD PRESSURE: 78 MMHG | OXYGEN SATURATION: 98 % | TEMPERATURE: 98.5 F | RESPIRATION RATE: 18 BRPM | HEIGHT: 70 IN | BODY MASS INDEX: 20.76 KG/M2 | WEIGHT: 145 LBS | SYSTOLIC BLOOD PRESSURE: 115 MMHG

## 2024-10-01 DIAGNOSIS — G89.29 CHRONIC PAIN OF BOTH LOWER EXTREMITIES: Primary | ICD-10-CM

## 2024-10-01 DIAGNOSIS — M79.604 CHRONIC PAIN OF BOTH LOWER EXTREMITIES: Primary | ICD-10-CM

## 2024-10-01 DIAGNOSIS — Z59.00 HOMELESS: ICD-10-CM

## 2024-10-01 DIAGNOSIS — M79.605 CHRONIC PAIN OF BOTH LOWER EXTREMITIES: Primary | ICD-10-CM

## 2024-10-01 PROCEDURE — 99283 EMERGENCY DEPT VISIT LOW MDM: CPT

## 2024-10-01 SDOH — ECONOMIC STABILITY - HOUSING INSECURITY: HOMELESSNESS UNSPECIFIED: Z59.00

## 2024-10-01 NOTE — ED PROVIDER NOTES
" EMERGENCY DEPARTMENT ENCOUNTER  Room Number:  06/06  PCP: Provider, No Known  Independent Historians: Patient      HPI:  Chief Complaint: had concerns including Foot Pain (Pt states he has been having bilateral foot pain for \"a long time.\").     A complete HPI/ROS/PMH/PSH/SH/FH are unobtainable due to: Uncooperative    Chronic or social conditions impacting patient care (Social Determinants of Health): Homeless, No Reliable Transportation, Food Insecurity, Unemployed, Lack of social support, Economic Stability (Employment, Income, Expenses, Debt, Medical Bills, Financial Resource Strain), Neighborhood and Physical Environment (Housing Instability, Lack of Reliable Transportation, Safety, Zip Code/Geography), Education (Literacy, Language, Early Childhood Education, Vocational Training, Higher Education), Food (Hunger, Food Insecurity, Access to Healthy Options), Community and Social Context (Social Integration, Support Systems, Community Engagement, Intimate Partner Violence, Discrimination, Stress), and Health Care System (Health Coverage, Provider Availability, Provider Linguistic and Cultural Competency, Quality of Care)      Context: Navneet Gil is a 24 y.o. male with a medical history of polysubstance abuse, homelessness, malingering who presents emergency department today after he called an ambulance from the [facility] to bring him to the hospital because his legs and feet hurt.  Patient is homeless and says he has been walking outside all day.  He denies any other particular complaints today.  He denies numbness or tingling in the extremities.  He is uncooperative with exam.      Review of prior external notes (non-ED) -and- Review of prior external test results outside of this encounter:   Patient was seen at [facility] on 9/21/2024 for heroin overdose.  He had 4 mg of intranasal Narcan at that time.    Patient was seen in at [facility] on 3/11/2024 for malingering and insomnia      PAST " MEDICAL HISTORY  Active Ambulatory Problems     Diagnosis Date Noted    No Active Ambulatory Problems     Resolved Ambulatory Problems     Diagnosis Date Noted    No Resolved Ambulatory Problems     Past Medical History:   Diagnosis Date    Depression     Hypertension          PAST SURGICAL HISTORY  History reviewed. No pertinent surgical history.      FAMILY HISTORY  History reviewed. No pertinent family history.      SOCIAL HISTORY  Social History     Socioeconomic History    Marital status: Single   Tobacco Use    Smoking status: Every Day     Current packs/day: 0.50     Types: Cigarettes    Smokeless tobacco: Never   Vaping Use    Vaping status: Never Used   Substance and Sexual Activity    Alcohol use: Yes     Comment: social    Drug use: Yes     Types: IV, Methamphetamines, Marijuana, Cocaine(coke)    Sexual activity: Defer         ALLERGIES  Patient has no known allergies.      REVIEW OF SYSTEMS  Included in HPI  All systems reviewed and negative except for those discussed in HPI.      PHYSICAL EXAM    I have reviewed the triage vital signs and nursing notes.    ED Triage Vitals [10/01/24 0325]   Temp Heart Rate Resp BP SpO2   98.5 °F (36.9 °C) 85 18 115/78 98 %      Temp src Heart Rate Source Patient Position BP Location FiO2 (%)   Oral -- -- -- --       Physical Exam  Constitutional:       Comments: Disheveled, poor hygiene   HENT:      Head: Normocephalic and atraumatic.      Mouth/Throat:      Mouth: Mucous membranes are moist.   Eyes:      Extraocular Movements: Extraocular movements intact.      Pupils: Pupils are equal, round, and reactive to light.   Cardiovascular:      Rate and Rhythm: Normal rate and regular rhythm.      Pulses: Normal pulses.      Heart sounds: Normal heart sounds.   Pulmonary:      Effort: Pulmonary effort is normal. No respiratory distress.      Breath sounds: Normal breath sounds.   Abdominal:      General: Abdomen is flat. There is no distension.      Palpations: Abdomen is  soft.      Tenderness: There is no abdominal tenderness.   Musculoskeletal:         General: Normal range of motion.      Cervical back: Normal range of motion and neck supple.      Comments: Bilateral lower legs are dirty but are warm and well-perfused.  2+ DP pulses.  No open wounds.   Skin:     General: Skin is warm and dry.      Capillary Refill: Capillary refill takes less than 2 seconds.   Neurological:      General: No focal deficit present.      Mental Status: He is alert and oriented to person, place, and time.   Psychiatric:         Mood and Affect: Mood normal.         Behavior: Behavior normal.           OUTPATIENT MEDICATION MANAGEMENT:  No current Epic-ordered facility-administered medications on file.     Current Outpatient Medications Ordered in Epic   Medication Sig Dispense Refill    Invega Sustenna 234 MG/1.5ML suspension prefilled syringe IM injection ADMINISTER 1.5 ML IN THE MUSCLE EVERY 28 DAYS FOR PSYCHOSIS             PROGRESS, DATA ANALYSIS, CONSULTS, AND MEDICAL DECISION MAKING  ED Course:  ED Course as of 10/01/24 2013   Tue Oct 01, 2024   0348 I discussed the case with Dr. Lorenzo and he agrees to evaluate the patient at the bedside.    [CC]   0416 Patient is a 24-year-old male who is homeless who presents emergency department today with bilateral leg pain.  On evaluation patient's legs are dirty but skin is clean dry and intact.   legs are warm and well-perfused.  There is been no new acute injury and his legs hurt because he has been walking secondary to being homeless - XR not indicated.  Patient's legs were washed with warm soapy water and towel dried and he was given clean socks.  He was also given food here in the ED.  There is no indication for further workup here in the ED today.  He will be discharged. [CC]      ED Course User Index  [CC] Kaylie Crocker PA-C           AS OF 20:13 EDT VITALS:    BP - 115/78  HR - 85  TEMP - 98.5 °F (36.9 °C) (Oral)  O2 SATS -  98%            DIAGNOSIS  Final diagnoses:   Homeless   Chronic pain of both lower extremities         DISPOSITION  ED Disposition       ED Disposition   Discharge    Condition   Stable    Comment   --                    Please note that portions of this document were completed with a voice recognition program.    Note Disclaimer: At [facility], we believe that sharing information builds trust and better relationships. You are receiving this note because you recently visited [facility]. It is possible you will see health information before a provider has talked with you about it. This kind of information can be easy to misunderstand. To help you fully understand what it means for your health, we urge you to discuss this note with your provider.     Kaylie Crocker PA-C  10/01/24 2013

## 2024-10-01 NOTE — ED NOTES
Pt still refusing to leave, yelling at nurse to leave him alone. Bus pass offered, but pt refused. Security called to assist pt out.

## 2024-10-01 NOTE — ED NOTES
Pt  presents to ER via EMS with c/o feet and legs hurting after walking. Pt is homeless, is heavily soiled with dirt. Skin is dry and he has multiple scabs to upper and lower ext's. Feet are black with dirt, also noted to have new and old blisters, cracked skin and new and old blisters. Pt is wearing rubber sandals, states he is homeless and has been walking a lot. States he is tired, hungry and his legs and feet hurt. Meal provided.

## 2024-10-02 ENCOUNTER — HOSPITAL ENCOUNTER (EMERGENCY)
Facility: HOSPITAL | Age: 24
Discharge: HOME | End: 2024-10-02
Attending: EMERGENCY MEDICINE
Payer: MEDICAID

## 2024-10-02 VITALS
SYSTOLIC BLOOD PRESSURE: 125 MMHG | OXYGEN SATURATION: 99 % | RESPIRATION RATE: 16 BRPM | DIASTOLIC BLOOD PRESSURE: 79 MMHG | WEIGHT: 145.06 LBS | HEIGHT: 70 IN | HEART RATE: 99 BPM | TEMPERATURE: 97.9 F | BODY MASS INDEX: 20.77 KG/M2

## 2024-10-02 DIAGNOSIS — Z59.00 HOMELESS: ICD-10-CM

## 2024-10-02 DIAGNOSIS — M79.671 BILATERAL FOOT PAIN: Primary | ICD-10-CM

## 2024-10-02 DIAGNOSIS — M79.672 BILATERAL FOOT PAIN: Primary | ICD-10-CM

## 2024-10-02 PROCEDURE — 99283 EMERGENCY DEPT VISIT LOW MDM: CPT

## 2024-10-02 RX ORDER — GINSENG 100 MG
1 CAPSULE ORAL 2 TIMES DAILY
Qty: 14 G | Refills: 0 | Status: SHIPPED | OUTPATIENT
Start: 2024-10-02

## 2024-10-02 SDOH — ECONOMIC STABILITY - HOUSING INSECURITY: HOMELESSNESS UNSPECIFIED: Z59.00

## 2024-10-03 NOTE — ED PROVIDER NOTES
EMERGENCY DEPARTMENT ENCOUNTER  Room Number:  29/29  Date of encounter:  10/2/2024  PCP: Provider, No Known  Patient Care Team:  Provider, No Known as PCP - General     HPI:  Context: Navneet Gil is a 24 y.o. male who presents to the ED c/o chief complaint of bilateral foot pain.  Patient reports that he has bilateral foot pain from walking a lot, has several small wounds on his but he is concerned for infection.  Patient denies any injury to the area, denies any drainage, no fevers.  Patient reports he is homeless.    MEDICAL HISTORY REVIEW  Reviewed in EPIC    PAST MEDICAL HISTORY  Active Ambulatory Problems     Diagnosis Date Noted    No Active Ambulatory Problems     Resolved Ambulatory Problems     Diagnosis Date Noted    No Resolved Ambulatory Problems     Past Medical History:   Diagnosis Date    Depression     Hypertension        PAST SURGICAL HISTORY  No past surgical history on file.    FAMILY HISTORY  No family history on file.    SOCIAL HISTORY  Social History     Socioeconomic History    Marital status: Single   Tobacco Use    Smoking status: Every Day     Current packs/day: 0.50     Types: Cigarettes    Smokeless tobacco: Never   Vaping Use    Vaping status: Never Used   Substance and Sexual Activity    Alcohol use: Yes     Comment: social    Drug use: Yes     Types: IV, Methamphetamines, Marijuana, Cocaine(coke)    Sexual activity: Defer       ALLERGIES  Patient has no known allergies.    The patient's allergies have been reviewed    REVIEW OF SYSTEMS  All systems reviewed and negative except for those discussed in HPI.     PHYSICAL EXAM  I have reviewed the triage vital signs and nursing notes.  ED Triage Vitals [10/02/24 2113]   Temp Heart Rate Resp BP SpO2   97.9 °F (36.6 °C) 99 16 125/79 99 %      Temp src Heart Rate Source Patient Position BP Location FiO2 (%)   -- -- -- -- --       General: No acute distress.  HENT: NCAT, PERRL, Nares patent.  Eyes: no scleral icterus.  Neck: trachea  midline, no ROM limitations.  CV: regular rhythm, regular rate.  Respiratory: normal effort, CTAB.  Abdomen: soft, nondistended, NTTP, no rebound tenderness, no guarding or rigidity.  Musculoskeletal: no deformity.  Neuro: alert, moves all extremities, follows commands.  Skin: warm, dry.  Patient has several small abrasions on bilateral feet but no signs of infection, no trauma, good blood supply.    LAB RESULTS  No results found for this or any previous visit (from the past 24 hour(s)).    I ordered the above labs and reviewed the results.    RADIOLOGY  No Radiology Exams Resulted Within Past 24 Hours    I ordered the above noted radiological studies. I reviewed the images and results. I agree with the radiologist interpretation.    PROCEDURES  Procedures    MEDICATIONS GIVEN IN ER  Medications - No data to display    PROGRESS, DATA ANALYSIS, CONSULTS, AND MEDICAL DECISION MAKING  A complete history and physical exam have been performed.  All available laboratory and imaging results have been reviewed by myself prior to disposition.    MDM    After the initial H&P, I discussed pertinent information from history and physical exam with patient/family.  Discussed differential diagnosis.  Discussed plan for ED evaluation/workup/treatment.  All questions answered.  Patient/family is agreeable with plan.       AS OF 21:32 EDT VITALS:    BP - 125/79  HR - 99  TEMP - 97.9 °F (36.6 °C)  O2 SATS - 99%    DIAGNOSIS  Final diagnoses:   Bilateral foot pain   Homeless         DISPOSITION  DISCHARGE    Patient discharged in stable condition.    Reviewed implications of results, diagnosis, meds, responsibility to follow up, warning signs and symptoms of possible worsening, potential complications and reasons to return to ER.    Patient/Family voiced understanding of above instructions.    Discussed plan for discharge, as there is no emergent indication for admission. Patient referred to primary care provider for BP management due  to today's BP. Pt/family is agreeable and understands need for follow up and repeat testing.  Pt is aware that discharge does not mean that nothing is wrong but it indicates no emergency is present that requires admission and they must continue care with follow-up as given below or physician of their choice.     FOLLOW-UP  Your PCP    Schedule an appointment as soon as possible for a visit in 2 days  even if well    [facility] - [facility] [address]  [phone]    if you are unable to follow up with your PCP    [facility]  [address] [facility] [address] [phone]  Schedule an appointment as soon as possible for a visit   as needed         Medication List        New Prescriptions      bacitracin 500 UNIT/GM ointment  Apply 1 Application topically to the appropriate area as directed 2 (Two) Times a Day.               Where to Get Your Medications        These medications were sent to [id number] - [place] - [address] AT [facility] OF [address] [facility] - [phone]  - [phone] [facility] [place] [address]      Phone: [phone]   bacitracin 500 UNIT/GM ointment            Hasmukh Fowler MD  10/02/24 2645

## 2025-05-22 ENCOUNTER — HOSPITAL ENCOUNTER (EMERGENCY)
Facility: HOSPITAL | Age: 25
Discharge: LEFT BEFORE DISCHARGE | End: 2025-05-22

## 2025-05-22 VITALS
HEART RATE: 63 BPM | RESPIRATION RATE: 18 BRPM | OXYGEN SATURATION: 100 % | TEMPERATURE: 97.9 F | SYSTOLIC BLOOD PRESSURE: 120 MMHG | DIASTOLIC BLOOD PRESSURE: 80 MMHG

## 2025-05-22 PROCEDURE — 99211 OFF/OP EST MAY X REQ PHY/QHP: CPT
